# Patient Record
Sex: MALE | Race: WHITE | NOT HISPANIC OR LATINO | Employment: UNEMPLOYED | URBAN - METROPOLITAN AREA
[De-identification: names, ages, dates, MRNs, and addresses within clinical notes are randomized per-mention and may not be internally consistent; named-entity substitution may affect disease eponyms.]

---

## 2023-10-20 ENCOUNTER — OFFICE VISIT (OUTPATIENT)
Dept: NEUROLOGY | Facility: CLINIC | Age: 33
End: 2023-10-20
Payer: MEDICAID

## 2023-10-20 VITALS
DIASTOLIC BLOOD PRESSURE: 82 MMHG | SYSTOLIC BLOOD PRESSURE: 127 MMHG | HEIGHT: 71 IN | HEART RATE: 83 BPM | WEIGHT: 183 LBS | BODY MASS INDEX: 25.62 KG/M2

## 2023-10-20 DIAGNOSIS — F44.5 PSYCHOGENIC NONEPILEPTIC SEIZURE: Primary | ICD-10-CM

## 2023-10-20 DIAGNOSIS — F41.0 PANIC ATTACK: ICD-10-CM

## 2023-10-20 PROCEDURE — 1160F RVW MEDS BY RX/DR IN RCRD: CPT | Performed by: PSYCHIATRY & NEUROLOGY

## 2023-10-20 PROCEDURE — 1159F MED LIST DOCD IN RCRD: CPT | Performed by: PSYCHIATRY & NEUROLOGY

## 2023-10-20 PROCEDURE — 99204 OFFICE O/P NEW MOD 45 MIN: CPT | Performed by: PSYCHIATRY & NEUROLOGY

## 2023-10-20 NOTE — ASSESSMENT & PLAN NOTE
I discussed with him that his spells are highly likely to be secondary to panic attacks and psychogenic nonepileptic spells.  I discussed with him that I will refer him to the [facility] epilepsy clinic to do further work-up as well as for further referrals.  I would also recommend for him to be evaluated by pulmonary to determine if there is any primary problems with his breathing.  I would recommend for him to get an MRI of the brain as well.  Thank you for letting me participate in his care.

## 2023-10-20 NOTE — PROGRESS NOTES
Chief Complaint  Dizziness    Subjective          Tomer Friedman is a 32 y.o. male who presents to [facility]  History of Present Illness  42-year-old man evaluated for g dizziness, chest pain, palpitations, shortness of breath.  He thought he came here because of his neck.  He was told that he had abnormal curvature of the spine.  CT scan of the cervical spine is unremarkable.  He states that symptoms started last year and he passed out 3 times over the past year.  The last time he passed out was July 26.  He has had 2 other severe spells.  Those were the worst spells.  He states that the spells that he has that are moderate in severity twice a month lasting for 5 to 20 minutes.  This has been ongoing for the last 4 months.  He states that he has a spell that is usually mild every 2 to 3 days and it can happen 2-3 times a day lasting for 1 minute to 5 minutes.  They are all stereotypical.  He states that it is hard for him to breathe, he has palpitations, chest pain that is sharp associated with lightheadedness, anxiety attack.  If it is bad he has loss of consciousness.  He has to sit down and relax.  He does small breathing exercises.  He states when it is severe there is a home that goes and he said it becomes louder and louder and then he sees bright lights and gets weak and passes out.  He looks lifeless.  He states that it last for a few seconds but then it can happen again and he does not know exactly how long it lasts.  The other spells are not associated loss of consciousness but they are associated with chest discomfort, palpitations.  He has seen cardiology and he was found to have a normal work-up.  He has never seen pulmonary.  He has had a CT scan of the brain at [facility] which is unremarkable as well as a CT scan of the cervical spine which is unremarkable in July.    He states that he does not have anxiety disorder.  He lives with his  "parents.          rObjective   Vital Signs:   /82 (BP Location: Right arm, Patient Position: Sitting, Cuff Size: Adult)   Pulse 83   Ht 179.1 cm (70.5\")   Wt 83 kg (183 lb)   BMI 25.89 kg/m²     Physical Exam   Alert, fluent, phasic, follows commands well.  Optic disc are normal bilaterally visual fields full to confrontation, EOMs full all directions gaze, facial strength is full, soft palate elevation and tongue are normal.  There is no weakness of the upper or lower extremities and vision muscle testing.  Fine finger movements are intact.  Reflexes are normoactive and symmetrical in the biceps, triceps, patellar's and ankles.  Cerebellar testing is intact.  Station gait he is able to tiptoe, heel walk, yehuda and tandem without difficulty.  Heart is regular rhythm normal in rate.  Discussed with him regarding hyperventilation and hyperventilation for 2 minutes did not reproduce her symptoms other than lightheadedness and tingling but none of the other spells.        Assessment and Plan  Diagnoses and all orders for this visit:    1. Psychogenic nonepileptic seizure (Primary)  Assessment & Plan:  I discussed with him that his spells are highly likely to be secondary to panic attacks and psychogenic nonepileptic spells.  I discussed with him that I will refer him to the [facility] epilepsy clinic to do further work-up as well as for further referrals.  I would also recommend for him to be evaluated by pulmonary to determine if there is any primary problems with his breathing.  I would recommend for him to get an MRI of the brain as well.  Thank you for letting me participate in his care.    Orders:  -     Ambulatory Referral to Neurology    2. Panic attack  -     Ambulatory Referral to Neurology         Total time spent with the patient and coordinating patient care was 45 minutes.    Follow Up  No follow-ups on file.  Patient was given instructions and counseling regarding his condition or for " health maintenance advice. Please see specific information pulled into the AVS if appropriate.

## 2023-12-04 ENCOUNTER — HOSPITAL ENCOUNTER (OUTPATIENT)
Dept: MRI IMAGING | Facility: HOSPITAL | Age: 33
Discharge: HOME | End: 2023-12-04
Admitting: PSYCHIATRY & NEUROLOGY
Payer: MEDICAID

## 2023-12-04 DIAGNOSIS — F41.0 PANIC ATTACK: ICD-10-CM

## 2023-12-04 DIAGNOSIS — F44.5 PSYCHOGENIC NONEPILEPTIC SEIZURE: ICD-10-CM

## 2023-12-04 PROCEDURE — 70551 MRI BRAIN STEM W/O DYE: CPT
